# Patient Record
Sex: MALE | Race: BLACK OR AFRICAN AMERICAN | NOT HISPANIC OR LATINO | Employment: UNEMPLOYED | RURAL
[De-identification: names, ages, dates, MRNs, and addresses within clinical notes are randomized per-mention and may not be internally consistent; named-entity substitution may affect disease eponyms.]

---

## 2023-06-20 PROBLEM — I20.8 ANGINA AT REST: Status: ACTIVE | Noted: 2023-06-20

## 2023-06-20 PROBLEM — E66.9 OBESITY (BMI 30-39.9): Status: ACTIVE | Noted: 2023-06-20

## 2023-06-21 PROBLEM — R93.89 ABNORMAL COMPUTED TOMOGRAPHY ANGIOGRAPHY (CTA): Status: ACTIVE | Noted: 2023-06-20

## 2023-06-30 PROBLEM — I25.119 CHEST PAIN DUE TO CORONARY ARTERY DISEASE: Status: ACTIVE | Noted: 2023-06-30

## 2023-07-19 ENCOUNTER — OFFICE VISIT (OUTPATIENT)
Dept: CARDIOLOGY | Facility: CLINIC | Age: 48
End: 2023-07-19

## 2023-07-19 VITALS
HEIGHT: 69 IN | HEART RATE: 76 BPM | WEIGHT: 216.4 LBS | BODY MASS INDEX: 32.05 KG/M2 | OXYGEN SATURATION: 97 % | DIASTOLIC BLOOD PRESSURE: 78 MMHG | SYSTOLIC BLOOD PRESSURE: 114 MMHG

## 2023-07-19 DIAGNOSIS — I25.10 ENDOTHELIAL DYSFUNCTION OF CORONARY ARTERY: ICD-10-CM

## 2023-07-19 DIAGNOSIS — I20.8 ANGINA AT REST: Primary | ICD-10-CM

## 2023-07-19 RX ORDER — EVOLOCUMAB 140 MG/ML
INJECTION, SOLUTION SUBCUTANEOUS
COMMUNITY
Start: 2023-06-27

## 2023-07-19 RX ORDER — TRAZODONE HYDROCHLORIDE 100 MG/1
1 TABLET ORAL DAILY PRN
COMMUNITY

## 2023-07-19 RX ORDER — LISINOPRIL 5 MG/1
2.5 TABLET ORAL NIGHTLY
Qty: 90 TABLET | Refills: 0 | Status: SHIPPED | OUTPATIENT
Start: 2023-07-19

## 2023-07-19 NOTE — PROGRESS NOTES
"Hospital Follow Up Visit      History of Present Illness  Mr. Dallas Corcoran is a 48-year-old  male with medical history of hypertension, hyperlipidemia, previous MI.  Was seen at [facility] for complaints of chest pain in June 2023.  Coronary CTA was performed and showed calcium score 49, moderate eccentric calcified and noncalcified plaque in the distal RCA with areas of mild to moderate stenosis.  Echocardiogram performed showing preserved LVEF at 58% normal diastolic function.  RVSP normal.  Mild TVR.    Due to chest pain at rest and abnormal CTA he underwent left heart catheterization by Dr. Alvarado.  Found to have mild obstructive CAD, normal left-sided filling pressures, \" sluggish flow and epicardial arteries suggestive of potential endothelial dysfunction versus micro dysfunction\"     He was sent home on Lopressor 25 mg twice daily, aspirin 81 mg, Norvasc, Crestor 40 mg.  Not able to tolerate Imdur.  Ranexa cause dizziness, drowsiness and lethargy.    He presents today for his hospital follow-up visit.  He is still having chest pain.  Per left heart cath report and Dr. Alvarado's note he may have endothelial dysfunction.    Cardiac Risk Factors:  Has mild CAD.  Hypertension, hypercholesterolemia , obesity   Previous MI    Past Medical History:   Diagnosis Date    Hypercholesterolemia     Hypertension      Past Surgical History:   Procedure Laterality Date    CARDIAC CATHETERIZATION N/A 6/21/2023    Procedure: Left Heart Cath;  Surgeon: Robert Alvarado MD;  Location: [facility] CATH INVASIVE LOCATION;  Service: Cardiology;  Laterality: N/A;    CLAVICLE SURGERY       Social History     Socioeconomic History    Marital status: Single   Tobacco Use    Smoking status: Never    Smokeless tobacco: Never   Vaping Use    Vaping Use: Never used   Substance and Sexual Activity    Alcohol use: Never    Drug use: Never     Family History   Problem Relation Age of Onset    Hypertension Father     Heart " disease Father        ALLERGIES:  Allergies   Allergen Reactions    Oxycodone Itching    Simvastatin Myalgia     Other reaction(s): Myalgias (Muscle Pain)    Duloxetine Anxiety         Review of Systems   Constitutional: Negative for diaphoresis, malaise/fatigue and night sweats.   Cardiovascular:  Positive for chest pain and dyspnea on exertion. Negative for irregular heartbeat, leg swelling and palpitations.   Respiratory:  Negative for cough, shortness of breath and sleep disturbances due to breathing.    Endocrine: Negative for polydipsia and polyphagia.   Musculoskeletal:  Negative for muscle cramps, muscle weakness and myalgias.   Gastrointestinal:  Negative for bloating, abdominal pain, heartburn, melena, nausea and vomiting.   Neurological:  Negative for dizziness, light-headedness, numbness and weakness.     Current Outpatient Medications   Medication Sig Dispense Refill    aspirin 81 MG EC tablet Take 1 tablet by mouth Daily.      Evolocumab (Repatha SureClick) solution auto-injector SureClick injection Inject 1 mL every 2 weeks by subcutaneous route.      fexofenadine (ALLEGRA) 60 MG tablet Take 1 tablet by mouth Daily.      Omega-3 Fatty Acids (fish oil) 1000 MG capsule capsule Take  by mouth Daily With Breakfast.      rosuvastatin (CRESTOR) 20 MG tablet Take 2 tablets by mouth Every Night.      traZODone (DESYREL) 100 MG tablet Take 1 tablet by mouth Daily As Needed.      Vitamin D, Cholecalciferol, (CHOLECALCIFEROL) 10 MCG (400 UNIT) tablet Take 2.5 tablets by mouth Daily.      lisinopril (PRINIVIL,ZESTRIL) 5 MG tablet Take 0.5 tablets by mouth Every Night. 90 tablet 0    metoprolol tartrate (LOPRESSOR) 25 MG tablet Take 1 tablet by mouth 2 (Two) Times a Day. 180 tablet 3     No current facility-administered medications for this visit.       OBJECTIVE:    Physical Exam:   Vitals reviewed.   Constitutional:       Appearance: Healthy appearance. Well-developed, overweight and not in distress.   Eyes:      " Conjunctiva/sclera: Conjunctivae normal.   HENT:      Head: Normocephalic.    Mouth/Throat:      Lips: Pink.      Mouth: Mucous membranes are moist.   Neck:      Thyroid: No thyromegaly.      Vascular: JVD normal.   Pulmonary:      Effort: Pulmonary effort is normal.      Breath sounds: Normal breath sounds. No wheezing. No rhonchi.   Cardiovascular:      PMI at left midclavicular line. Normal rate. Regular rhythm. Normal S1 with normal intensity. Normal S2 with normal intensity.       Murmurs: There is no murmur.      No gallop.    Pulses:     Carotid: 2+ bilaterally.  Edema:     Peripheral edema absent.   Neurological:      Mental Status: Alert.      Gait: Gait is intact.   Psychiatric:         Behavior: Behavior is cooperative.     Vitals:    07/19/23 1436   BP: 114/78   BP Location: Left arm   Patient Position: Sitting   Cuff Size: Adult   Pulse: 76   SpO2: 97%   Weight: 98.2 kg (216 lb 6.4 oz)   Height: 175.3 cm (69\")       DATA REVIEWED:   Results for orders placed during the hospital encounter of 06/20/23    Adult Transthoracic Echo Complete w/ Color, Spectral and Contrast if Necessary Per Protocol    Interpretation Summary    Left ventricular systolic function is normal. Left ventricular ejection fraction appears to be 56 - 60%.    Left ventricular diastolic function was normal.    Estimated right ventricular systolic pressure from tricuspid regurgitation is normal (<35 mmHg).      No radiology results for the last 30 days.  CXR:     CT:     Labs: BMP, CBC, LIPID, TSH  Lab Results   Component Value Date    GLUCOSE 89 07/01/2023    CALCIUM 9.2 07/01/2023     07/01/2023    K 4.1 07/01/2023    CO2 22.0 07/01/2023     07/01/2023    BUN 9 07/01/2023    CREATININE 0.84 07/01/2023    BCR 10.7 07/01/2023    ANIONGAP 11.0 07/01/2023     Lab Results   Component Value Date    WBC 5.95 07/01/2023    HGB 14.5 07/01/2023    HCT 43.0 07/01/2023    MCV 86.5 07/01/2023     07/01/2023     No results found " for: CHOL  No results found for: TRIG  No results found for: HDL  No components found for: LDLCALC  No results found for: LDL  No results found for: HDLLDLRATIO  No components found for: CHOLHDL  Lab Results   Component Value Date    TSH 1.780 06/20/2023     Lab Results   Component Value Date    PROBNP <36.0 06/30/2023     EKG:     TTE:   Interpretation Summary         Left ventricular systolic function is normal. Left ventricular ejection fraction appears to be 56 - 60%.    Left ventricular diastolic function was normal.    Estimated right ventricular systolic pressure from tricuspid regurgitation is normal (<35 mmHg).     CTA of coronaries:   Narrative & Impression   HISTORY:  Chest pain.     COMPARISON:  None.     TECHNIQUE:  Axial images through the heart were completed without contrast.  Then, IV  contrast was administered and axial images through the heart were completed.  3D  multiplanar and gated reformats of the coronary arteries were completed on a  separate work station under concurrent supervision.     FINDINGS:  Noncardiac Findings: There are no suspicious nodules.  There are no mediastinal  lesions.  There are no upper abdominal lesions.  There are no osseous lesions.     Calcium Score: The patient's calcium score is 49.     Function: Ejection fraction 53%.  End-diastolic volume 91 mL.  End-systolic  volume 43 mL. Stroke volume 48 mL. Cardiac output wall motion appears normal.     Left Main: The left main coronary artery arises from the left coronary cusp.  There is no plaque or stenosis.     LAD: The left anterior descending coronary artery arises from the left main  coronary artery.  There are small first and second diagonal branches and a small  first septal branch.  There is no plaque or stenosis.     RCA: The right coronary artery arises from the right coronary cusp.  The right  coronary artery supplies the PDA.  Within the distal RCA there is mild to  moderate degree of eccentric calcified and  noncalcified plaque resulting in mild  to moderate stenoses.     LCx: The left circumflex coronary artery arises from the left main coronary  artery.  There is a very early large obtuse marginal branch.  There is no plaque  or stenosis.     IMPRESSION:  1.  Moderate eccentric calcified and noncalcified plaque within the distal RCA  with areas of mild-to-moderate stenosis.     2.  Ejection fraction 53%.     3.  Calcium score 49.       LHC:  Conclusion:  Mild nonobstructive coronary artery disease  Normal left-sided filling pressures  Sluggish flow in epicardial arteries suggestive of potential endothelial dysfunction versus microvascular dysfunction.  Complications:  None  EBL: 5ml   Specimen: None   Recommendations:   Risk factor modification for PROM prevention  2.  IV fluids per protocol for prevention of MIKEY      This document has been electronically signed by Robert Alvarado MD on June 21, 2023 16:51 CDT      The following portions of the patient's history were reviewed and updated as appropriate: allergies, current medications, past family history, past medical history, past social history, past surgical history and problem list.  Old records reviewed and pertinent information is included in the above objective data.     ASSESSMENT/PLAN:       Diagnosis Plan   1. Angina at rest  lisinopril (PRINIVIL,ZESTRIL) 5 MG tablet    metoprolol tartrate (LOPRESSOR) 25 MG tablet      2. Endothelial dysfunction of coronary artery  lisinopril (PRINIVIL,ZESTRIL) 5 MG tablet    metoprolol tartrate (LOPRESSOR) 25 MG tablet          1./2.  Angina at rest/potential endothelial dysfunction versus microvascular dysfunction    Continues having chest pain at rest.  Underwent extensive cardiac evaluation while in the hospital in June 2023.  Unable to tolerate Imdur or Ranexa.  Also intolerant to simvastatin due to myalgias.    Continue Lopressor 25 mg twice daily, refilled  Start lisinopril 5 mg daily  continue Crestor 20 mg nightly.   Prescription sent in by Dr. Alvarado for Amanda.  PA in process       I spent 30 minutes caring for Dallas on this date of service. This time includes time spent by me in the following activities: preparing for the visit, reviewing tests, obtaining and/or reviewing a separately obtained history, performing a medically appropriate examination and/or evaluation, counseling and educating the patient/family/caregiver, ordering medications, tests, or procedures, and documenting information in the medical record  Return in about 6 weeks (around 8/30/2023) for Recheck.      Seen on 7/19/2023.  Chart completed late.  This document has been electronically signed by GERHARD Mensah on September 10, 2023 12:38 CDT   Electronically signed by GERHARD Mensah, 09/10/23, 12:38 PM CDT.

## 2023-09-10 PROBLEM — I25.10 ENDOTHELIAL DYSFUNCTION OF CORONARY ARTERY: Status: ACTIVE | Noted: 2023-09-10
